# Patient Record
Sex: FEMALE | Race: WHITE | NOT HISPANIC OR LATINO | URBAN - NONMETROPOLITAN AREA
[De-identification: names, ages, dates, MRNs, and addresses within clinical notes are randomized per-mention and may not be internally consistent; named-entity substitution may affect disease eponyms.]

---

## 2021-09-23 ENCOUNTER — IMPORTED ENCOUNTER (OUTPATIENT)
Dept: URBAN - NONMETROPOLITAN AREA CLINIC 1 | Facility: CLINIC | Age: 51
End: 2021-09-23

## 2021-09-23 PROBLEM — H10.423: Noted: 2021-09-23

## 2021-09-23 PROBLEM — E11.9: Noted: 2018-02-02

## 2021-09-23 PROCEDURE — 99203 OFFICE O/P NEW LOW 30 MIN: CPT

## 2021-09-23 NOTE — PATIENT DISCUSSION
Ocular Allergies -  discussed findings w/patient -  discussed signs and symptoms associated -  2+ papillae noted OU today -  PF 1% TID OU x 1 week then QD OU x 1 week then d/c-  start Pataday after stopping PF-  continue to monitor

## 2021-12-10 ENCOUNTER — IMPORTED ENCOUNTER (OUTPATIENT)
Dept: URBAN - NONMETROPOLITAN AREA CLINIC 1 | Facility: CLINIC | Age: 51
End: 2021-12-10

## 2021-12-10 PROCEDURE — 99213 OFFICE O/P EST LOW 20 MIN: CPT

## 2022-04-10 ASSESSMENT — VISUAL ACUITY
OD_SC: 20/25-
OD_SC: 20/25+2
OS_SC: 20/25+2
OS_SC: 20/30+2

## 2022-04-10 ASSESSMENT — TONOMETRY
OS_IOP_MMHG: 18
OD_IOP_MMHG: 18